# Patient Record
(demographics unavailable — no encounter records)

---

## 2024-12-16 NOTE — HEALTH RISK ASSESSMENT
[Excellent] : ~his/her~  mood as  excellent [No] : In the past 12 months have you used drugs other than those required for medical reasons? No [No falls in past year] : Patient reported no falls in the past year [0] : 2) Feeling down, depressed, or hopeless: Not at all (0) [PHQ-2 Negative - No further assessment needed] : PHQ-2 Negative - No further assessment needed [Never] : Never [Patient reported colonoscopy was normal] : Patient reported colonoscopy was normal [HIV test declined] : HIV test declined [None] : None [Alone] : lives alone [Retired] : retired [] :  [# Of Children ___] : has [unfilled] children [Fully functional (bathing, dressing, toileting, transferring, walking, feeding)] : Fully functional (bathing, dressing, toileting, transferring, walking, feeding) [Fully functional (using the telephone, shopping, preparing meals, housekeeping, doing laundry, using] : Fully functional and needs no help or supervision to perform IADLs (using the telephone, shopping, preparing meals, housekeeping, doing laundry, using transportation, managing medications and managing finances) [[id number]] : 0 [Change in mental status noted] : No change in mental status noted [Language] : denies difficulty with language [Reports changes in hearing] : Reports no changes in hearing [Reports changes in vision] : Reports no changes in vision [MammogramDate] : 12/23 [MammogramComments] : BIRADS 2 [ColonoscopyDate] : 01/10 [ColonoscopyComments] : FIT negative 12/23

## 2024-12-16 NOTE — HISTORY OF PRESENT ILLNESS
[FreeTextEntry1] : GEGE [de-identified] : Patient comes for an annual exam.  She reports feeling well. Has no major complaints. She is compliant with medications. She remains off Fosamax. She never scheduled rheumatology consult.

## 2024-12-16 NOTE — PHYSICAL EXAM
[No Acute Distress] : no acute distress [Well Nourished] : well nourished [Well Developed] : well developed [Well-Appearing] : well-appearing [Normal Sclera/Conjunctiva] : normal sclera/conjunctiva [PERRL] : pupils equal round and reactive to light [EOMI] : extraocular movements intact [Normal Outer Ear/Nose] : the outer ears and nose were normal in appearance [Normal Oropharynx] : the oropharynx was normal [Normal TMs] : both tympanic membranes were normal [No JVD] : no jugular venous distention [Supple] : supple [No Lymphadenopathy] : no lymphadenopathy [No Respiratory Distress] : no respiratory distress  [Clear to Auscultation] : lungs were clear to auscultation bilaterally [Normal Rate] : normal rate  [Regular Rhythm] : with a regular rhythm [Normal S1, S2] : normal S1 and S2 [No Murmur] : no murmur heard [No Varicosities] : no varicosities [Pedal Pulses Present] : the pedal pulses are present [No Edema] : there was no peripheral edema [Soft] : abdomen soft [Non Tender] : non-tender [Non-distended] : non-distended [Normal Bowel Sounds] : normal bowel sounds [No Spinal Tenderness] : no spinal tenderness [No Joint Swelling] : no joint swelling [Grossly Normal Strength/Tone] : grossly normal strength/tone [Normal Gait] : normal gait [Coordination Grossly Intact] : coordination grossly intact [Normal Affect] : the affect was normal [Normal Mood] : the mood was normal [Normal Insight/Judgement] : insight and judgment were intact [Kyphosis] : no kyphosis [de-identified] : friendly [de-identified] : PND [de-identified] : defer to GYN  [de-identified] : left knee crepitus [de-identified] : multiple hypopigmented lesions on lower legs R>L

## 2024-12-16 NOTE — PLAN
[FreeTextEntry1] : Check routine fasting labs as above. EKG NSR. Discussed diet, exercise, and weight maintenance. Flu shot given today. Script given for mammogram. DM - check A1c, CMP, and urine microalbumin. Continue Metformin. See ophthalmology for dilated eye exam. HLD - check fasting lipids and chemistries. Continue Pravastatin.  Continue Pepcid for GERD. Osteoporosis - referred again to rheumatology. Off Fosamax.  Use Flonase for PND / cough.  RTO 6 months for fasting labs.

## 2024-12-16 NOTE — REVIEW OF SYSTEMS
[Joint Pain] : joint pain [Cough] : cough [Back Pain] : no back pain [Negative] : ENT [FreeTextEntry9] : chronic knee pain